# Patient Record
Sex: FEMALE | Race: OTHER | HISPANIC OR LATINO | URBAN - METROPOLITAN AREA
[De-identification: names, ages, dates, MRNs, and addresses within clinical notes are randomized per-mention and may not be internally consistent; named-entity substitution may affect disease eponyms.]

---

## 2017-10-10 ENCOUNTER — INPATIENT (INPATIENT)
Facility: HOSPITAL | Age: 52
LOS: 0 days | Discharge: HOME | DRG: 287 | End: 2017-10-10
Attending: INTERNAL MEDICINE | Admitting: INTERNAL MEDICINE
Payer: MEDICARE

## 2017-10-10 VITALS
SYSTOLIC BLOOD PRESSURE: 137 MMHG | TEMPERATURE: 98 F | RESPIRATION RATE: 18 BRPM | DIASTOLIC BLOOD PRESSURE: 76 MMHG | HEART RATE: 72 BPM | WEIGHT: 199.96 LBS | HEIGHT: 62 IN | OXYGEN SATURATION: 99 %

## 2017-10-10 VITALS
OXYGEN SATURATION: 99 % | RESPIRATION RATE: 18 BRPM | HEART RATE: 72 BPM | SYSTOLIC BLOOD PRESSURE: 137 MMHG | DIASTOLIC BLOOD PRESSURE: 76 MMHG | TEMPERATURE: 98 F

## 2017-10-10 DIAGNOSIS — Z90.13 ACQUIRED ABSENCE OF BILATERAL BREASTS AND NIPPLES: Chronic | ICD-10-CM

## 2017-10-10 DIAGNOSIS — Z98.890 OTHER SPECIFIED POSTPROCEDURAL STATES: Chronic | ICD-10-CM

## 2017-10-10 DIAGNOSIS — Z90.710 ACQUIRED ABSENCE OF BOTH CERVIX AND UTERUS: Chronic | ICD-10-CM

## 2017-10-10 LAB
ALBUMIN SERPL ELPH-MCNC: 4.1 G/DL — SIGNIFICANT CHANGE UP (ref 3.3–5)
ALP SERPL-CCNC: 126 U/L — HIGH (ref 40–120)
ALT FLD-CCNC: 18 U/L — SIGNIFICANT CHANGE UP (ref 10–45)
ANION GAP SERPL CALC-SCNC: 16 MMOL/L — SIGNIFICANT CHANGE UP (ref 5–17)
APPEARANCE UR: CLEAR — SIGNIFICANT CHANGE UP
APTT BLD: 35.8 SEC — SIGNIFICANT CHANGE UP (ref 27.5–37.4)
AST SERPL-CCNC: 17 U/L — SIGNIFICANT CHANGE UP (ref 10–40)
BASOPHILS NFR BLD AUTO: 0.3 % — SIGNIFICANT CHANGE UP (ref 0–2)
BILIRUB SERPL-MCNC: 0.4 MG/DL — SIGNIFICANT CHANGE UP (ref 0.2–1.2)
BILIRUB UR-MCNC: NEGATIVE — SIGNIFICANT CHANGE UP
BLD GP AB SCN SERPL QL: NEGATIVE — SIGNIFICANT CHANGE UP
BUN SERPL-MCNC: 13 MG/DL — SIGNIFICANT CHANGE UP (ref 7–23)
CALCIUM SERPL-MCNC: 9.7 MG/DL — SIGNIFICANT CHANGE UP (ref 8.4–10.5)
CHLORIDE SERPL-SCNC: 105 MMOL/L — SIGNIFICANT CHANGE UP (ref 96–108)
CK MB CFR SERPL CALC: 1.5 NG/ML — SIGNIFICANT CHANGE UP (ref 0–6.7)
CK SERPL-CCNC: 69 U/L — SIGNIFICANT CHANGE UP (ref 25–170)
CO2 SERPL-SCNC: 21 MMOL/L — LOW (ref 22–31)
COLOR SPEC: YELLOW — SIGNIFICANT CHANGE UP
CREAT SERPL-MCNC: 0.71 MG/DL — SIGNIFICANT CHANGE UP (ref 0.5–1.3)
DIFF PNL FLD: NEGATIVE — SIGNIFICANT CHANGE UP
EOSINOPHIL NFR BLD AUTO: 2.2 % — SIGNIFICANT CHANGE UP (ref 0–6)
GLUCOSE SERPL-MCNC: 90 MG/DL — SIGNIFICANT CHANGE UP (ref 70–99)
GLUCOSE UR QL: NEGATIVE — SIGNIFICANT CHANGE UP
HCT VFR BLD CALC: 40 % — SIGNIFICANT CHANGE UP (ref 34.5–45)
HGB BLD-MCNC: 12.8 G/DL — SIGNIFICANT CHANGE UP (ref 11.5–15.5)
INR BLD: 1 — SIGNIFICANT CHANGE UP (ref 0.88–1.16)
KETONES UR-MCNC: NEGATIVE — SIGNIFICANT CHANGE UP
LEUKOCYTE ESTERASE UR-ACNC: NEGATIVE — SIGNIFICANT CHANGE UP
LYMPHOCYTES # BLD AUTO: 20.5 % — SIGNIFICANT CHANGE UP (ref 13–44)
MCHC RBC-ENTMCNC: 24.2 PG — LOW (ref 27–34)
MCHC RBC-ENTMCNC: 32 G/DL — SIGNIFICANT CHANGE UP (ref 32–36)
MCV RBC AUTO: 75.5 FL — LOW (ref 80–100)
MONOCYTES NFR BLD AUTO: 4.5 % — SIGNIFICANT CHANGE UP (ref 2–14)
NEUTROPHILS NFR BLD AUTO: 72.5 % — SIGNIFICANT CHANGE UP (ref 43–77)
NITRITE UR-MCNC: NEGATIVE — SIGNIFICANT CHANGE UP
PH UR: 6 — SIGNIFICANT CHANGE UP (ref 5–8)
PLATELET # BLD AUTO: 264 K/UL — SIGNIFICANT CHANGE UP (ref 150–400)
POTASSIUM SERPL-MCNC: 3.9 MMOL/L — SIGNIFICANT CHANGE UP (ref 3.5–5.3)
POTASSIUM SERPL-SCNC: 3.9 MMOL/L — SIGNIFICANT CHANGE UP (ref 3.5–5.3)
PROT SERPL-MCNC: 7.5 G/DL — SIGNIFICANT CHANGE UP (ref 6–8.3)
PROT UR-MCNC: NEGATIVE MG/DL — SIGNIFICANT CHANGE UP
PROTHROM AB SERPL-ACNC: 11.1 SEC — SIGNIFICANT CHANGE UP (ref 9.8–12.7)
RBC # BLD: 5.3 M/UL — HIGH (ref 3.8–5.2)
RBC # FLD: 16.4 % — SIGNIFICANT CHANGE UP (ref 10.3–16.9)
RH IG SCN BLD-IMP: POSITIVE — SIGNIFICANT CHANGE UP
SODIUM SERPL-SCNC: 142 MMOL/L — SIGNIFICANT CHANGE UP (ref 135–145)
SP GR SPEC: 1.01 — SIGNIFICANT CHANGE UP (ref 1–1.03)
TROPONIN T SERPL-MCNC: <0.01 NG/ML — SIGNIFICANT CHANGE UP (ref 0–0.01)
UROBILINOGEN FLD QL: 0.2 E.U./DL — SIGNIFICANT CHANGE UP
WBC # BLD: 11.2 K/UL — HIGH (ref 3.8–10.5)
WBC # FLD AUTO: 11.2 K/UL — HIGH (ref 3.8–10.5)

## 2017-10-10 PROCEDURE — 99221 1ST HOSP IP/OBS SF/LOW 40: CPT | Mod: 25

## 2017-10-10 PROCEDURE — 99285 EMERGENCY DEPT VISIT HI MDM: CPT | Mod: 25

## 2017-10-10 PROCEDURE — 71010: CPT | Mod: 26

## 2017-10-10 PROCEDURE — 93458 L HRT ARTERY/VENTRICLE ANGIO: CPT | Mod: 26

## 2017-10-10 PROCEDURE — 93010 ELECTROCARDIOGRAM REPORT: CPT

## 2017-10-10 RX ORDER — SODIUM CHLORIDE 9 MG/ML
500 INJECTION INTRAMUSCULAR; INTRAVENOUS; SUBCUTANEOUS
Qty: 0 | Refills: 0 | Status: DISCONTINUED | OUTPATIENT
Start: 2017-10-10 | End: 2017-10-10

## 2017-10-10 RX ORDER — METOPROLOL TARTRATE 50 MG
1 TABLET ORAL
Qty: 30 | Refills: 3
Start: 2017-10-10 | End: 2018-02-06

## 2017-10-10 NOTE — DISCHARGE NOTE ADULT - PLAN OF CARE
Follow up with Dr. Dominguez in one week for a check up. You may continue all your medications. Please start taking Toprol XL 50 mg daily. 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 This condition is harmless. Please take Toprol Xl 50 mg daily to help prevent chest discomfort.

## 2017-10-10 NOTE — ED PROVIDER NOTE - OBJECTIVE STATEMENT
53 y/o f with h/o bilateral breast ca [id number] and ovarian ca presents to ED c/o dizziness intermittently for 3 weeks with symptoms of CASTAÑEDA.  She was seen by her cardiologist and had a scheduled stress test today. She report earlier today upon sitting on her couch she thinks she passed out. Admit of feeling lightheaded today with left side radiating pain at left arm upon doing stress test. Denies recent URI, cough, no active chest pain, n,v, abd pain, paresthesia, paresis. Patient is here for pending cardiac cath. She was given ASA and Brilinta in office prior to being sent  to ED. 51 y/o f with h/o bilateral breast ca [id number] and ovarian ca presents to ED c/o dizziness intermittently for 3 weeks with symptoms of CASTAÑEDA.  She was seen by her cardiologist Dr. Dominguez and had a scheduled stress test today. She report earlier today upon sitting on her couch she thinks she passed out. Admit of feeling lightheaded today with left side radiating pain at left arm upon doing stress test. Denies recent URI, cough, no active chest pain, n,v, abd pain, paresthesia, paresis. Patient is here for pending cardiac cath. She was given ASA and Brilinta in office prior to being sent  to ED.

## 2017-10-10 NOTE — ED ADULT TRIAGE NOTE - CHIEF COMPLAINT QUOTE
BIBA for positive stress test. Denies any CP at this time, SOB, dizziness. Patient received 325mg Aspirin, Brilinta.

## 2017-10-10 NOTE — DISCHARGE NOTE ADULT - INSTRUCTIONS
•	Your procedure was done through your wrist  •	You do not need to keep this area covered and you may shower  •	Please avoid any heavy lifting  (no more than 3 to 5 lbs) or strenuous activity for five days  •	If you develop any swelling, bleeding, hardening of the skin (hematoma formation), acute pain, numbness/tingling  in your arm  please contact your doctor immediately or call our 24/7 line: [phone]

## 2017-10-10 NOTE — DISCHARGE NOTE ADULT - HOSPITAL COURSE
52 yr obese old female, former smoker with a PMHx of breast CA (2012/2014, s/p chemo/radiation, b/l mastectomy), ovarian cancer (s/p hysterectomy 2016), Chemotherapy-Induced Peripheral Neuropathy, L5 stimulator in place, GERD, known PVD s/p bilateral iliac stenting 4/2014, asthma (last hospitalization 2005) presented to outpatient cardiologist Dr. Dominguez with the chief complaint of dizziness when bending down x three weeks. Upon further questioning patient admits to recent left jaw pain at rest and CASTAÑEDA associated with fatigue when walking her dogs. Patient denies syncope, palpitations, fever, chills, PND, orthopnea, N/V, diaphoresis.  Patient was recommended for EKG stress test today and developed non radiating left sided chest pain 6/10 in severity, associated with nausea, diaphoresis, and dizziness.  Stress test revealed inferior ST depressions during stress. Patient was loaded in office with ASA 325mg PO x 1 dose and Brilinta 180mg PO x 1 dose and was referred to [facility] ED for cardiac catheterization. Upon arrival to [facility], BP: 137/76, HR: 72, RR: 18, Temp:97.8F, O2 sat: 99% RA. EKG revealed NSR@65BPM with no acute ST or T wave changes. Cardiac enzymes negative x 1 set. Pt is now s/p cardiac catheterization today revealing 30% mLAD small myocardial bridge. LVEF 60%, LVEF 15 mmHG.  Patient recommended to take Toprol XL 50 mg daily for myocardial bridge and follow up with Dr. Dominguez in one week for a checkup. Patient stable for discharge this evening per Dr. Pena.

## 2017-10-10 NOTE — ED PROVIDER NOTE - MEDICAL DECISION MAKING DETAILS
Patient with positive stress test sent by cardiologist for cardiac cath. Well appearing, NAD and vSS. Labs and ekg wnl . Neg trop , cxr neg

## 2017-10-10 NOTE — DISCHARGE NOTE ADULT - CARE PLAN
Principal Discharge DX:	Myocardial bridge  Goal:	Follow up with Dr. Dominguez in one week for a check up. You may continue all your medications. Please start taking Toprol XL 50 mg daily.  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 This condition is harmless. Please take Toprol Xl 50 mg daily to help prevent chest discomfort.

## 2017-10-10 NOTE — ED PROVIDER NOTE - ATTENDING CONTRIBUTION TO CARE
1 day lightheadedness near syncope L arm pain ? related to heart.  Nl exam.  EKG noted.  Labs and admit to cardiology concern for ACS.

## 2017-10-10 NOTE — ED ADULT NURSE NOTE - OBJECTIVE STATEMENT
Patient BIBA stating, "I was having a stress test and they had to stop.  I started having pains in my chest and I got dizzy."  Patient is A&Ox3, in NAD, complaining of left sided chest pain, SOB, diaphoresis, dizziness and nausea during cardiac stress test.  Patient denies chest pain at this time.  PMHx of Breast Ca and Ovarian Ca.  PIV placed, labs drawn and sent, EKG done, placed on cardiac monitor.  Will continue with plan of care.

## 2017-10-10 NOTE — H&P ADULT - ASSESSMENT
52 yr obese old female, former smoker with a PMHx of breast CA (2012/2014, s/p chemo/radiation, b/l mastectomy), ovarian cancer (s/p hysterectomy 2016), Chemotherapy-Induced Peripheral Neuropathy, L5 stimulator in place, GERD, known PVD s/p bilateral iliac stenting 4/2014, asthma (last hospitalization 2005) presents for cardiac catheterization with possible intervention if clinically indicated due to abnormal stress test today associated with development of chest pain, recent CCS Angina Class III-IV symptoms and patient's risk factors. Assessment: Unstable Angina  Plan:  52 yr obese old female, former smoker with a PMHx of breast CA (2012/2014, s/p chemo/radiation, b/l mastectomy), ovarian cancer (s/p hysterectomy 2016), Chemotherapy-Induced Peripheral Neuropathy, L5 stimulator in place, GERD, known PVD s/p bilateral iliac stenting 4/2014, asthma (last hospitalization 2005) presents for cardiac catheterization with possible intervention if clinically indicated due to abnormal stress test today associated with development of chest pain, recent CCS Angina Class III-IV symptoms and patient's risk factors.

## 2017-10-10 NOTE — H&P ADULT - FAMILY HISTORY
Sibling  Still living? Unknown  Family history of breast cancer, Age at diagnosis: Age Unknown     Father  Still living? Unknown  Family history of coronary artery disease, Age at diagnosis: Age Unknown  Family history of diabetes mellitus, Age at diagnosis: Age Unknown     Mother  Still living? Unknown  Family history of coronary artery disease, Age at diagnosis: Age Unknown  Family history of diabetes mellitus, Age at diagnosis: Age Unknown

## 2017-10-10 NOTE — DISCHARGE NOTE ADULT - MEDICATION SUMMARY - MEDICATIONS TO TAKE
I will START or STAY ON the medications listed below when I get home from the hospital:    gabapentin 600 mg oral tablet  -- 1 tab(s) by mouth 3 times a day  -- Indication: For NEUROPATHY    exemestane 25 mg oral tablet  -- 1 tab(s) by mouth once a day  -- Indication: For CANCER TREATMENT    Toprol-XL 50 mg oral tablet, extended release  -- 1 tab(s) by mouth once a day   -- It is very important that you take or use this exactly as directed.  Do not skip doses or discontinue unless directed by your doctor.  May cause drowsiness.  Alcohol may intensify this effect.  Use care when operating dangerous machinery.  Some non-prescription drugs may aggravate your condition.  Read all labels carefully.  If a warning appears, check with your doctor before taking.  Swallow whole.  Do not crush.  Take with food or milk.  This drug may impair the ability to drive or operate machinery.  Use care until you become familiar with its effects.    -- Indication: For MYOCARDIAL BRIDGE    Proventil HFA 90 mcg/inh inhalation aerosol  -- 2 puff(s) inhaled once a day, As Needed  -- Indication: For ASTHMA    Lasix 20 mg oral tablet  -- 1 tab(s) by mouth once a day  -- Indication: For WATER PILL FOR LEG SWELLING    Pepcid 20 mg oral tablet  -- 1 tab(s) by mouth once a day  -- Indication: For GERD    Vitamin D2  -- orally once a day  -- Indication: For VITAMIN D

## 2017-10-10 NOTE — DISCHARGE NOTE ADULT - PATIENT PORTAL LINK FT
“You can access the [facility] Patient Portal, offered by [facility], by registering with the following website: [email or web address]”

## 2017-10-10 NOTE — H&P ADULT - ATTENDING COMMENTS
Agree w History of Present Illness, Allergies/Medications, Patient History, Physical Exam, Assessment and Plan

## 2017-10-10 NOTE — H&P ADULT - HISTORY OF PRESENT ILLNESS
52 yr old F with PMHx of breast CA/Ovarian CA who presented to outpatient cardiologist Dr. Dominguez c/o dizziness x 3 weeks. Patient states dizziness worse over the past 24hrs, as she was sitting on the couch today and passed out. Patient further admits to CASTAÑEDA and decline in ET.   EKG stress test today revealed inferior st depressions during stress. Patient loaded with ASA 325mg PO x 1 dose and Brilinta 180mg PO x 1 dose and referred to ED.   In ED, BP: 137/76, HR: 72, RR: 18, Temp:97.8F, O2 sat: 99% RA. EKG revealed NSR@65BPM with no acute ST or T wave changes. Cardiac enzymes negative x 1 set.  Patient currently stable, planned for cardiac catheterization to R/O underlying ischemia. 52 yr old F with PMHx of breast CA/Ovarian CA who presented to outpatient cardiologist Dr. Dominguez c/o dizziness x 3 weeks. Patient states dizziness worse over the past 24hrs.  Patient further admits to CASTAÑEDA and decline in ET.   During EKG stress test today patient developed nonradiating left sided chest pain 6/10 in severity, associated with nausea, diaphoresis, and dizziness.  Stress test revealed inferior ST depressions during stress. Patient loaded with ASA 325mg PO x 1 dose and Brilinta 180mg PO x 1 dose and referred to ED.   In ED, BP: 137/76, HR: 72, RR: 18, Temp:97.8F, O2 sat: 99% RA. EKG revealed NSR@65BPM with no acute ST or T wave changes. Cardiac enzymes negative x 1 set.  Patient currently stable, planned for cardiac catheterization to R/O underlying ischemia. 52 yr obese old female, former smoker with a PMHx of breast CA (2012/2014, s/p chemo/radiation, b/l mastectomy), ovarian cancer (s/p hysterectomy 2016), Chemotherapy-Induced Peripheral Neuropathy, L5 stimulator in place, GERD, known PVD s/p bilateral iliac stenting 4/2014, asthma (last hospitalization 2005) presented to outpatient cardiologist Dr. Dominguez with the chief complaint of dizziness when bending down x three weeks. Upon further questioning patient admits to recent left jaw pain at rest and CASTAÑEDA associated with fatigue when walking her dogs. Patient denies syncope, palpitations, fever, chills, PND, orthopnea, N/V, diaphoresis.  Patient was recommended for EKG stress test today and developed non radiating left sided chest pain 6/10 in severity, associated with nausea, diaphoresis, and dizziness.  Stress test revealed inferior ST depressions during stress. Patient was loaded in office with ASA 325mg PO x 1 dose and Brilinta 180mg PO x 1 dose and was referred to [facility] ED for cardiac catheterization. Upon arrival to [facility], BP: 137/76, HR: 72, RR: 18, Temp:97.8F, O2 sat: 99% RA. EKG revealed NSR@65BPM with no acute ST or T wave changes. Cardiac enzymes negative x 1 set. Patient currently stable, with plan for cardiac catheterization today to rule out underlying CAD.

## 2017-10-13 DIAGNOSIS — K21.9 GASTRO-ESOPHAGEAL REFLUX DISEASE WITHOUT ESOPHAGITIS: ICD-10-CM

## 2017-10-13 DIAGNOSIS — G62.0 DRUG-INDUCED POLYNEUROPATHY: ICD-10-CM

## 2017-10-13 DIAGNOSIS — Z83.3 FAMILY HISTORY OF DIABETES MELLITUS: ICD-10-CM

## 2017-10-13 DIAGNOSIS — J45.909 UNSPECIFIED ASTHMA, UNCOMPLICATED: ICD-10-CM

## 2017-10-13 DIAGNOSIS — E66.9 OBESITY, UNSPECIFIED: ICD-10-CM

## 2017-10-13 DIAGNOSIS — I73.9 PERIPHERAL VASCULAR DISEASE, UNSPECIFIED: ICD-10-CM

## 2017-10-13 DIAGNOSIS — R07.9 CHEST PAIN, UNSPECIFIED: ICD-10-CM

## 2017-10-13 DIAGNOSIS — T45.1X5A ADVERSE EFFECT OF ANTINEOPLASTIC AND IMMUNOSUPPRESSIVE DRUGS, INITIAL ENCOUNTER: ICD-10-CM

## 2017-10-13 DIAGNOSIS — R55 SYNCOPE AND COLLAPSE: ICD-10-CM

## 2017-10-13 DIAGNOSIS — R42 DIZZINESS AND GIDDINESS: ICD-10-CM

## 2017-10-13 DIAGNOSIS — Z82.49 FAMILY HISTORY OF ISCHEMIC HEART DISEASE AND OTHER DISEASES OF THE CIRCULATORY SYSTEM: ICD-10-CM

## 2017-10-13 DIAGNOSIS — Z88.2 ALLERGY STATUS TO SULFONAMIDES: ICD-10-CM
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
## 2017-10-13 DIAGNOSIS — Q24.5 MALFORMATION OF CORONARY VESSELS: ICD-10-CM

## 2017-10-13 DIAGNOSIS — Z80.3 FAMILY HISTORY OF MALIGNANT NEOPLASM OF BREAST: ICD-10-CM

## 2017-12-19 PROCEDURE — 84484 ASSAY OF TROPONIN QUANT: CPT

## 2017-12-19 PROCEDURE — C1887: CPT

## 2017-12-19 PROCEDURE — 85730 THROMBOPLASTIN TIME PARTIAL: CPT

## 2017-12-19 PROCEDURE — 82550 ASSAY OF CK (CPK): CPT

## 2017-12-19 PROCEDURE — 86900 BLOOD TYPING SEROLOGIC ABO: CPT

## 2017-12-19 PROCEDURE — 71045 X-RAY EXAM CHEST 1 VIEW: CPT

## 2017-12-19 PROCEDURE — 81003 URINALYSIS AUTO W/O SCOPE: CPT

## 2017-12-19 PROCEDURE — 36415 COLL VENOUS BLD VENIPUNCTURE: CPT

## 2017-12-19 PROCEDURE — 82553 CREATINE MB FRACTION: CPT

## 2017-12-19 PROCEDURE — 85025 COMPLETE CBC W/AUTO DIFF WBC: CPT

## 2017-12-19 PROCEDURE — 93005 ELECTROCARDIOGRAM TRACING: CPT

## 2017-12-19 PROCEDURE — 99285 EMERGENCY DEPT VISIT HI MDM: CPT | Mod: 25

## 2017-12-19 PROCEDURE — 85610 PROTHROMBIN TIME: CPT

## 2017-12-19 PROCEDURE — C1769: CPT

## 2017-12-19 PROCEDURE — 80053 COMPREHEN METABOLIC PANEL: CPT

## 2017-12-19 PROCEDURE — 86901 BLOOD TYPING SEROLOGIC RH(D): CPT

## 2017-12-19 PROCEDURE — 86850 RBC ANTIBODY SCREEN: CPT

## 2019-06-10 PROBLEM — C50.919 MALIGNANT NEOPLASM OF UNSPECIFIED SITE OF UNSPECIFIED FEMALE BREAST: Chronic | Status: ACTIVE | Noted: 2017-10-10

## 2019-06-10 PROBLEM — C56.9 MALIGNANT NEOPLASM OF UNSPECIFIED OVARY: Chronic | Status: ACTIVE | Noted: 2017-10-10

## 2019-06-12 VITALS
WEIGHT: 218.04 LBS | HEIGHT: 62 IN | OXYGEN SATURATION: 99 % | RESPIRATION RATE: 17 BRPM | TEMPERATURE: 98 F | SYSTOLIC BLOOD PRESSURE: 110 MMHG | DIASTOLIC BLOOD PRESSURE: 69 MMHG | HEART RATE: 81 BPM

## 2019-06-12 RX ORDER — CHLORHEXIDINE GLUCONATE 213 G/1000ML
1 SOLUTION TOPICAL ONCE
Refills: 0 | Status: DISCONTINUED | OUTPATIENT
Start: 2019-06-13 | End: 2019-06-13

## 2019-06-12 NOTE — H&P ADULT - RS GEN PE MLT RESP DETAILS PC
clear to auscultation bilaterally/respirations non-labored/no rales/breath sounds equal/no chest wall tenderness/no intercostal retractions/no rhonchi/good air movement

## 2019-06-12 NOTE — H&P ADULT - NSICDXFAMILYHX_GEN_ALL_CORE_FT
FAMILY HISTORY:  Father  Still living? Unknown  Family history of coronary artery disease, Age at diagnosis: Age Unknown  Family history of diabetes mellitus, Age at diagnosis: Age Unknown    Mother  Still living? Unknown  Family history of coronary artery disease, Age at diagnosis: Age Unknown  Family history of diabetes mellitus, Age at diagnosis: Age Unknown    Sibling  Still living? Unknown  Family history of breast cancer, Age at diagnosis: Age Unknown

## 2019-06-12 NOTE — H&P ADULT - ASSESSMENT
55yo F former smoker, with FamHx of CAD (parents) and PMHx of breast CA (2012, 2014 s/p chemo/radiation and B/L mastectomy), cervical CA (s/p hysterectomy 2016), chemo-induced peripheral neuropathy (L5 stimulator in place), GERD, PAD s/p bilateral iliac stent c/b DVT not on AC (2014), asthma (last hospitalization 2005), and known non-obstructive CAD by dx cardiac cath on 10/10/17 at [facility] revelaing mLAD 30% w/ small myocardial bridge with normal coronaries otherwise, who in light of pts risk factors, CCS class III anginal symptoms and known CAD hx, is now referred to [facility] for recommended cardiac catheterization with possible intervention if clinically indicated.    ASA: II   Mallampati: III    Precath consented  Loaded with ASA 325mg PO x 1 and Plavix 600mg PO x 1  IVF NS @ 75cc/hr    Risks & benefits of procedure and alternative therapy have been explained to the patient including but not limited to: allergic reaction, bleeding w/possible need for blood transfusion, infection, renal and vascular compromise, limb damage, arrhythmia, stroke, vessel dissection/perforation, Myocardial infarction, emergent CABG. Informed consent obtained and in chart. 53yo F former smoker, with FamHx of CAD (parents) and PMHx of breast CA (2012, 2014 s/p chemo/radiation and B/L mastectomy), cervical CA (s/p hysterectomy 2016), chemo-induced peripheral neuropathy (L5 stimulator in place), GERD, PAD s/p bilateral iliac stent c/b DVT not on AC (2014), asthma (last hospitalization 2005), and known non-obstructive CAD by dx cardiac cath on 10/10/17 at [facility] revelaing mLAD 30% w/ small myocardial bridge with normal coronaries otherwise, who in light of pts risk factors, CCS class III anginal symptoms and known CAD hx, is now referred to [facility] for recommended cardiac catheterization with possible intervention if clinically indicated.    ASA: II   Mallampati: III    Precath consented  Loaded with ASA 325mg PO x 1 and Plavix 600mg PO x 1  IVF NS @ 75cc/hr  K 3.4, ordered KCl 40mEq PO x 1 prior to cath and KCl 20mEq PO x 1 post cath    Risks & benefits of procedure and alternative therapy have been explained to the patient including but not limited to: allergic reaction, bleeding w/possible need for blood transfusion, infection, renal and vascular compromise, limb damage, arrhythmia, stroke, vessel dissection/perforation, Myocardial infarction, emergent CABG. Informed consent obtained and in chart.

## 2019-06-12 NOTE — H&P ADULT - NSICDXPASTMEDICALHX_GEN_ALL_CORE_FT
PAST MEDICAL HISTORY:  Asthma     Breast CA     GERD (gastroesophageal reflux disease)     Ovarian cancer     PAD (peripheral artery disease)

## 2019-06-12 NOTE — H&P ADULT - NSICDXPASTSURGICALHX_GEN_ALL_CORE_FT
PAST SURGICAL HISTORY:  H/O abdominal hysterectomy     H/O bilateral mastectomy     H/O major abdominal surgery

## 2019-06-13 ENCOUNTER — OUTPATIENT (OUTPATIENT)
Dept: OUTPATIENT SERVICES | Facility: HOSPITAL | Age: 54
LOS: 1 days | Discharge: NURSING, REHAB OR LONG-TERM CARE | End: 2019-06-13
Payer: MEDICARE

## 2019-06-13 DIAGNOSIS — Z90.710 ACQUIRED ABSENCE OF BOTH CERVIX AND UTERUS: Chronic | ICD-10-CM

## 2019-06-13 DIAGNOSIS — Z90.13 ACQUIRED ABSENCE OF BILATERAL BREASTS AND NIPPLES: Chronic | ICD-10-CM

## 2019-06-13 DIAGNOSIS — Z98.890 OTHER SPECIFIED POSTPROCEDURAL STATES: Chronic | ICD-10-CM

## 2019-06-13 LAB
ALBUMIN SERPL ELPH-MCNC: 4 G/DL — SIGNIFICANT CHANGE UP (ref 3.3–5)
ALP SERPL-CCNC: 150 U/L — HIGH (ref 40–120)
ALT FLD-CCNC: 16 U/L — SIGNIFICANT CHANGE UP (ref 10–45)
ANION GAP SERPL CALC-SCNC: 12 MMOL/L — SIGNIFICANT CHANGE UP (ref 5–17)
APTT BLD: 29.4 SEC — SIGNIFICANT CHANGE UP (ref 27.5–36.3)
AST SERPL-CCNC: 16 U/L — SIGNIFICANT CHANGE UP (ref 10–40)
BASOPHILS # BLD AUTO: 0.03 K/UL — SIGNIFICANT CHANGE UP (ref 0–0.2)
BASOPHILS NFR BLD AUTO: 0.4 % — SIGNIFICANT CHANGE UP (ref 0–2)
BILIRUB SERPL-MCNC: 0.3 MG/DL — SIGNIFICANT CHANGE UP (ref 0.2–1.2)
BUN SERPL-MCNC: 12 MG/DL — SIGNIFICANT CHANGE UP (ref 7–23)
CALCIUM SERPL-MCNC: 9.3 MG/DL — SIGNIFICANT CHANGE UP (ref 8.4–10.5)
CHLORIDE SERPL-SCNC: 104 MMOL/L — SIGNIFICANT CHANGE UP (ref 96–108)
CHOLEST SERPL-MCNC: 148 MG/DL — SIGNIFICANT CHANGE UP (ref 10–199)
CK MB CFR SERPL CALC: 1.9 NG/ML — SIGNIFICANT CHANGE UP (ref 0–6.7)
CK SERPL-CCNC: 108 U/L — SIGNIFICANT CHANGE UP (ref 25–170)
CO2 SERPL-SCNC: 23 MMOL/L — SIGNIFICANT CHANGE UP (ref 22–31)
CREAT SERPL-MCNC: 0.73 MG/DL — SIGNIFICANT CHANGE UP (ref 0.5–1.3)
EOSINOPHIL # BLD AUTO: 0.2 K/UL — SIGNIFICANT CHANGE UP (ref 0–0.5)
EOSINOPHIL NFR BLD AUTO: 2.4 % — SIGNIFICANT CHANGE UP (ref 0–6)
GLUCOSE SERPL-MCNC: 110 MG/DL — HIGH (ref 70–99)
HBA1C BLD-MCNC: 6 % — HIGH (ref 4–5.6)
HCT VFR BLD CALC: 40.2 % — SIGNIFICANT CHANGE UP (ref 34.5–45)
HDLC SERPL-MCNC: 49 MG/DL — LOW
HGB BLD-MCNC: 12 G/DL — SIGNIFICANT CHANGE UP (ref 11.5–15.5)
IMM GRANULOCYTES NFR BLD AUTO: 0.4 % — SIGNIFICANT CHANGE UP (ref 0–1.5)
INR BLD: 1.03 — SIGNIFICANT CHANGE UP (ref 0.88–1.16)
LIPID PNL WITH DIRECT LDL SERPL: 90 MG/DL — SIGNIFICANT CHANGE UP
LYMPHOCYTES # BLD AUTO: 1.89 K/UL — SIGNIFICANT CHANGE UP (ref 1–3.3)
LYMPHOCYTES # BLD AUTO: 22.3 % — SIGNIFICANT CHANGE UP (ref 13–44)
MCHC RBC-ENTMCNC: 24.1 PG — LOW (ref 27–34)
MCHC RBC-ENTMCNC: 29.9 GM/DL — LOW (ref 32–36)
MCV RBC AUTO: 80.9 FL — SIGNIFICANT CHANGE UP (ref 80–100)
MONOCYTES # BLD AUTO: 0.49 K/UL — SIGNIFICANT CHANGE UP (ref 0–0.9)
MONOCYTES NFR BLD AUTO: 5.8 % — SIGNIFICANT CHANGE UP (ref 2–14)
NEUTROPHILS # BLD AUTO: 5.85 K/UL — SIGNIFICANT CHANGE UP (ref 1.8–7.4)
NEUTROPHILS NFR BLD AUTO: 68.7 % — SIGNIFICANT CHANGE UP (ref 43–77)
NRBC # BLD: 0 /100 WBCS — SIGNIFICANT CHANGE UP (ref 0–0)
PLATELET # BLD AUTO: 241 K/UL — SIGNIFICANT CHANGE UP (ref 150–400)
POTASSIUM SERPL-MCNC: 3.4 MMOL/L — LOW (ref 3.5–5.3)
POTASSIUM SERPL-SCNC: 3.4 MMOL/L — LOW (ref 3.5–5.3)
PROT SERPL-MCNC: 7.5 G/DL — SIGNIFICANT CHANGE UP (ref 6–8.3)
PROTHROM AB SERPL-ACNC: 11.6 SEC — SIGNIFICANT CHANGE UP (ref 10–12.9)
RBC # BLD: 4.97 M/UL — SIGNIFICANT CHANGE UP (ref 3.8–5.2)
RBC # FLD: 16.3 % — HIGH (ref 10.3–14.5)
SODIUM SERPL-SCNC: 139 MMOL/L — SIGNIFICANT CHANGE UP (ref 135–145)
TOTAL CHOLESTEROL/HDL RATIO MEASUREMENT: 3 RATIO — LOW (ref 3.3–7.1)
TRIGL SERPL-MCNC: 46 MG/DL — SIGNIFICANT CHANGE UP (ref 10–149)
WBC # BLD: 8.49 K/UL — SIGNIFICANT CHANGE UP (ref 3.8–10.5)
WBC # FLD AUTO: 8.49 K/UL — SIGNIFICANT CHANGE UP (ref 3.8–10.5)

## 2019-06-13 PROCEDURE — 93458 L HRT ARTERY/VENTRICLE ANGIO: CPT

## 2019-06-13 PROCEDURE — 80061 LIPID PANEL: CPT

## 2019-06-13 PROCEDURE — C1894: CPT

## 2019-06-13 PROCEDURE — 85730 THROMBOPLASTIN TIME PARTIAL: CPT

## 2019-06-13 PROCEDURE — 93458 L HRT ARTERY/VENTRICLE ANGIO: CPT | Mod: 26

## 2019-06-13 PROCEDURE — 93010 ELECTROCARDIOGRAM REPORT: CPT

## 2019-06-13 PROCEDURE — 80053 COMPREHEN METABOLIC PANEL: CPT

## 2019-06-13 PROCEDURE — 82553 CREATINE MB FRACTION: CPT

## 2019-06-13 PROCEDURE — 85025 COMPLETE CBC W/AUTO DIFF WBC: CPT

## 2019-06-13 PROCEDURE — 85610 PROTHROMBIN TIME: CPT

## 2019-06-13 PROCEDURE — C1887: CPT

## 2019-06-13 PROCEDURE — 93005 ELECTROCARDIOGRAM TRACING: CPT

## 2019-06-13 PROCEDURE — 83036 HEMOGLOBIN GLYCOSYLATED A1C: CPT

## 2019-06-13 PROCEDURE — 82550 ASSAY OF CK (CPK): CPT

## 2019-06-13 PROCEDURE — C1769: CPT

## 2019-06-13 RX ORDER — SODIUM CHLORIDE 9 MG/ML
1000 INJECTION INTRAMUSCULAR; INTRAVENOUS; SUBCUTANEOUS
Refills: 0 | Status: DISCONTINUED | OUTPATIENT
Start: 2019-06-13 | End: 2019-06-13

## 2019-06-13 RX ORDER — ERGOCALCIFEROL 1.25 MG/1
1 CAPSULE ORAL
Qty: 0 | Refills: 0 | DISCHARGE

## 2019-06-13 RX ORDER — FAMOTIDINE 10 MG/ML
1 INJECTION INTRAVENOUS
Qty: 0 | Refills: 0 | DISCHARGE

## 2019-06-13 RX ORDER — POTASSIUM CHLORIDE 20 MEQ
40 PACKET (EA) ORAL ONCE
Refills: 0 | Status: COMPLETED | OUTPATIENT
Start: 2019-06-13 | End: 2019-06-13

## 2019-06-13 RX ORDER — ALBUTEROL 90 UG/1
2 AEROSOL, METERED ORAL
Qty: 0 | Refills: 0 | DISCHARGE

## 2019-06-13 RX ORDER — ERGOCALCIFEROL 1.25 MG/1
0 CAPSULE ORAL
Qty: 0 | Refills: 0 | DISCHARGE

## 2019-06-13 RX ORDER — SODIUM CHLORIDE 9 MG/ML
500 INJECTION INTRAMUSCULAR; INTRAVENOUS; SUBCUTANEOUS
Refills: 0 | Status: DISCONTINUED | OUTPATIENT
Start: 2019-06-13 | End: 2019-06-13

## 2019-06-13 RX ORDER — EXEMESTANE 25 MG/1
1 TABLET, SUGAR COATED ORAL
Qty: 0 | Refills: 0 | DISCHARGE

## 2019-06-13 RX ORDER — CLOPIDOGREL BISULFATE 75 MG/1
600 TABLET, FILM COATED ORAL ONCE
Refills: 0 | Status: COMPLETED | OUTPATIENT
Start: 2019-06-13 | End: 2019-06-13

## 2019-06-13 RX ORDER — METOPROLOL TARTRATE 50 MG
1 TABLET ORAL
Qty: 0 | Refills: 0 | DISCHARGE

## 2019-06-13 RX ORDER — FUROSEMIDE 40 MG
1 TABLET ORAL
Qty: 0 | Refills: 0 | DISCHARGE

## 2019-06-13 RX ORDER — ASPIRIN/CALCIUM CARB/MAGNESIUM 324 MG
325 TABLET ORAL ONCE
Refills: 0 | Status: COMPLETED | OUTPATIENT
Start: 2019-06-13 | End: 2019-06-13

## 2019-06-13 RX ORDER — GABAPENTIN 400 MG/1
1 CAPSULE ORAL
Qty: 0 | Refills: 0 | DISCHARGE

## 2019-06-13 RX ORDER — POTASSIUM CHLORIDE 20 MEQ
40 PACKET (EA) ORAL ONCE
Refills: 0 | Status: DISCONTINUED | OUTPATIENT
Start: 2019-06-13 | End: 2019-06-13

## 2019-06-13 RX ORDER — POTASSIUM CHLORIDE 20 MEQ
20 PACKET (EA) ORAL ONCE
Refills: 0 | Status: DISCONTINUED | OUTPATIENT
Start: 2019-06-13 | End: 2019-06-13

## 2019-06-13 RX ADMIN — CLOPIDOGREL BISULFATE 600 MILLIGRAM(S): 75 TABLET, FILM COATED ORAL at 08:24

## 2019-06-13 RX ADMIN — Medication 40 MILLIEQUIVALENT(S): at 09:55

## 2019-06-13 RX ADMIN — Medication 325 MILLIGRAM(S): at 08:24

## 2019-06-13 RX ADMIN — SODIUM CHLORIDE 75 MILLILITER(S): 9 INJECTION INTRAMUSCULAR; INTRAVENOUS; SUBCUTANEOUS at 08:03

## 2019-06-13 NOTE — PROGRESS NOTE ADULT - SUBJECTIVE AND OBJECTIVE BOX
Interventional Cardiology PA SDA Discharge Note    Patient without complaints. Ambulated and voided without difficulties    Afebrile, VSS    Ext:   Right Radial :  no hematoma, no  bleeding, dressing; C/D/I    Pulses:    intact RAD/DP/PT back to baseline     A/P:  53yo F former smoker, with FamHx of CAD (parents) and PMHx of breast CA (2012, 2014 s/p chemo/radiation and B/L mastectomy), cervical CA (s/p hysterectomy 2016), chemo-induced peripheral neuropathy (L5 stimulator in place), GERD, PAD s/p bilateral iliac stent c/b DVT not on AC (2014), asthma (last hospitalization 2005), and known non-obstructive CAD by dx cardiac cath on 10/10/17 at [facility] revelaing mLAD 30% w/ small myocardial bridge with normal coronaries otherwise, who presented to Dr. Dominguez for follow up c/o exertional CP with associated SOB when walking one block and/or up 1-2 flights progressively worsening over the last several months. Pt denies HA, palpitations, diaphoresis, dizziness, syncope, LE edema, fatigue, CASTAÑEDA, orthopnea, PND, N/V, melena, hematochezia, fever or chills. During office visit, pt was found to have a carotid bruit, and subsequently had a Carotid US which per MD note revealed no significant stenosis with tortuosity of right vessel that may have caused the bruit. Pt also had an Echo (2/11/19) revealing trace-mild MR, trace TR, EF 60% without WMA. Per MD note, pt not a candidate for stress test, given hx of peripheral neuropathy. Pt had Stress Echo on 10/10/17: [facility] Treadmill Score -5, moderate risk, 2-3mm STD in inferior leads.     In light of pts risk factors, CCS class III anginal symptoms and known CAD hx, pt is now referred to [facility] for recommended cardiac catheterization with possible intervention if clinically indicated.    Pt is s/p Cardiac Cath 6/13/19:  LM patent, mLAD: 30-50% tubular stenosis, LCx patent, RCA: dominant, patent with irregularities.  EF 65%, EDP 13.    1.	Stable for discharge as per attending Dr. Lombardi after bed rest, pt voids, groin/wrist stable and 30 minutes of ambulation.  2.	Follow-up with Cardiologist, Dr. Dominguez in 1-2 weeks  3.	Discharged forms signed and copies in chart

## 2022-11-09 NOTE — ED ADULT NURSE NOTE - CAS DISCH CONDITION
Social work referral for assistance with long term care planning.    Recommended to limit marijuana gummie use in patient.   Stable